# Patient Record
Sex: MALE | Race: WHITE
[De-identification: names, ages, dates, MRNs, and addresses within clinical notes are randomized per-mention and may not be internally consistent; named-entity substitution may affect disease eponyms.]

---

## 2019-08-20 ENCOUNTER — HOSPITAL ENCOUNTER (EMERGENCY)
Dept: HOSPITAL 47 - EC | Age: 74
Discharge: HOME | End: 2019-08-20
Payer: COMMERCIAL

## 2019-08-20 VITALS — SYSTOLIC BLOOD PRESSURE: 157 MMHG | DIASTOLIC BLOOD PRESSURE: 89 MMHG | HEART RATE: 93 BPM

## 2019-08-20 VITALS — RESPIRATION RATE: 18 BRPM

## 2019-08-20 VITALS — TEMPERATURE: 98 F
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
## 2019-08-20 DIAGNOSIS — I10: ICD-10-CM

## 2019-08-20 DIAGNOSIS — Z79.899: ICD-10-CM

## 2019-08-20 DIAGNOSIS — Z85.828: ICD-10-CM
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
## 2019-08-20 DIAGNOSIS — Z87.891: ICD-10-CM
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
## 2019-08-20 DIAGNOSIS — S61.412A: Primary | ICD-10-CM

## 2019-08-20 DIAGNOSIS — Z53.20: ICD-10-CM

## 2019-08-20 PROCEDURE — 99283 EMERGENCY DEPT VISIT LOW MDM: CPT
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
